# Patient Record
Sex: MALE | Race: WHITE
[De-identification: names, ages, dates, MRNs, and addresses within clinical notes are randomized per-mention and may not be internally consistent; named-entity substitution may affect disease eponyms.]

---

## 2017-07-02 NOTE — EDM.PDOC
[id number]   4d


CONFUSED AND FALLING


Time Seen by Provider: 07/02/17 19:30


Source of Information: Reports: Patient, Family


History Limitations: Reports: Altered Mental Status (Significant confusion)





- History of Present Illness


INITIAL COMMENTS - FREE TEXT/NARRATIVE: 





84-year-old male brought in by his daughter with increased confusion, 

intermittent hallucinations and increased falling. He has bruising to the left 

side of his chest, a few scattered bruises on his arms and anterior chest. He 

is also complaining of some left buttock discomfort but there is no bruising. 

He has long-standing normal mental status without confusion but it has become 

much worse over the past several weeks. No fevers or chills, no shortness of 

breath. He lives at an assisted living facility, today he was sitting in a 

wheelchair in his underwear in the hallway thinking it was [place] time and 

he was getting some children visiting him. This is very uncharacteristic of 

him. He has not been vomiting, feverish, a urine was checked last week which 

showed a few RBCs but no sign of infection. He was scheduled to be rechecked at 

the clinic this week but his daughter canceled that because the patient told 

her he "didn't need to go".


Onset: Unknown/Unsure


Severity: Moderate


Associated Symptoms: Reports: Other (Falling frequently)





- Related Data


 Allergies











Allergy/AdvReac Type Severity Reaction Status Date / Time


 


Penicillins Allergy Mild Swelling Verified 07/02/17 19:19


 


fentanyl [From Duragesic] Allergy  Rash Verified 07/02/17 19:19


 


nitroglycerin Allergy  Fainting Verified 07/02/17 19:19











Home Meds: 


 Home Meds





Metoprolol Succinate 25 mg PO QAM 09/07/13 [History]


Nortriptyline HCl [Pamelor] 150 mg PO BEDTIME 09/07/13 [History]


Omeprazole 20 mg PO DAILY 09/07/13 [History]


Pravastatin Sodium 20 mg PO DAILY 09/07/13 [History]


Warfarin [Coumadin] 2.5 mg PO .SMTTFS 09/07/13 [History]


Sennosides [Senna] 1 tab PO BID 10/24/13 [History]


Triamcinolone Acetonide [Triamcinolone Acetonide 0.1% Oint] 1 applic TOP TID 10/

24/13 [History]


traZODone 50 mg PO BEDTIME 10/24/13 [History]


Nystatin 1 applic TOP TID 11/05/14 [History]


Alum Hydrox/Mag Hydrox/Simeth [Maalox Advanced] 15 ml PO Q4H PRN 11/10/14 [

History]


Loperamide HCl [Anti-Diarrheal] 2 - 4 mg PO ASDIRECTED PRN 11/10/14 [History]


Magnesium Hydroxide [Milk of Magnesia] 30 ml PO DAILY PRN 11/10/14 [History]


Polyethylene Glycol 3350 [MiraLAX] 17 g PO DAILY 11/10/14 [History]


Warfarin [Coumadin] 3.75 mg PO .WED 11/10/14 [History]


Acetaminophen [Tylenol] 650 mg PO .Q4-6H PRN 07/14/15 [History]


Acetaminophen [Tylenol] 650 mg PO BID 07/14/15 [History]











Past Medical History





- Infectious Disease History


Infectious Disease History: Reports: Chicken Pox, Measles





Social & Family History





- Tobacco Use


Smoking Status *Q: Current Status Unknown


Years of Tobacco use: 30


Used Tobacco, but Quit: Yes


Month Tobacco Last Used: January


Second Hand Smoke Exposure: No





- Caffeine Use


Caffeine Use: Reports: Coffee





- Alcohol Use


Days Per Week of Alcohol Use: 1


Number of Drinks Per Day: 1


Total Drinks Per Week: 1





- Recreational Drug Use


Recreational Drug Use: No





ED ROS GENERAL





- Review of Systems


Review Of Systems: See Below


Constitutional: Reports: Weakness.  Denies: Fever, Chills, Malaise


HEENT: Reports: No Symptoms


Respiratory: Denies: Shortness of Breath


Cardiovascular: Reports: Chest Pain (Musculoskeletal discomfort only).  Denies: 

Palpitations


GI/Abdominal: Denies: Abdominal Pain, Nausea, Vomiting


: Reports: No Symptoms (Recent UA was negative except for a few RBCs)


Musculoskeletal: Reports: Other (Chest wall pain, some left wrist discomfort 

and left buttock discomfort from falls)


Skin: Reports: Bruising


Neurological: Reports: Confusion


Psychiatric: Reports: Hallucinations





ED EXAM, GENERAL





- Physical Exam


Exam: See Below


Exam Limited By: No Limitations (Patient is calm and cooperative, answering 

questions appropriately)


General Appearance: Alert, No Apparent Distress


Eye Exam: Bilateral Eye: Abnormal EOM


Throat/Mouth: Normal Inspection


Head: Atraumatic


Neck: Supple, Other (Some mild paraspinal discomfort which is chronic and stable

)


Respiratory/Chest: Lungs Clear


Cardiovascular: Regular Rate, Rhythm, Other (Patient has bruising over the left 

upper lateral chest wall with tenderness to palpation)


GI/Abdominal: Soft, Non-Tender


Extremities: Other (Some bruising over the left wrist but no significant bony 

tenderness or deformity)


Psychiatric: Normal Affect, Normal Mood.  No: Anxious





Course





- Vital Signs


Last Recorded V/S: 


 Last Vital Signs











Temp  97.0 F   07/02/17 19:13


 


Pulse  72   07/02/17 19:13


 


Resp  16   07/02/17 19:13


 


BP  127/68   07/02/17 19:13


 


Pulse Ox  97   07/02/17 19:13














- Orders/Labs/Meds


Orders: 


 Active Orders 24 hr











 Category Date Time Status


 


 Chest wo Cont [CT] Stat Exams  07/02/17 19:41 Taken


 


 Head wo Cont [CT] Stat Exams  07/02/17 19:41 Taken











Labs: 


 Laboratory Tests











  07/02/17 07/02/17 07/02/17 Range/Units





  19:58 19:58 19:58 


 


WBC  7.4    (4.5-11.0)  K/uL


 


RBC  3.63 L    (4.30-5.90)  M/uL


 


Hgb  11.2 L    (12.0-15.0)  g/dL


 


Hct  35.1 L    (40.0-54.0)  %


 


MCV  97    (80-98)  fL


 


MCH  31    (27-31)  pg


 


MCHC  32    (32-36)  %


 


Plt Count  196    (150-400)  K/uL


 


Neut % (Auto)  64    (36-66)  %


 


Lymph % (Auto)  23 L    (24-44)  %


 


Mono % (Auto)  8 H    (2-6)  %


 


Eos % (Auto)  6 H    (2-4)  %


 


Baso % (Auto)  0    (0-1)  %


 


PT   34.4 H   (9.5-12.0)  sec


 


INR   3.07 H   (0.80-1.20)  


 


Sodium    141  (140-148)  mmol/L


 


Potassium    3.9  (3.6-5.2)  mmol/L


 


Chloride    105  (100-108)  mmol/L


 


Carbon Dioxide    33 H  (21-32)  mmol/L


 


Anion Gap    6.9  (5.0-14.0)  mmol/L


 


BUN    22 H  (7-18)  mg/dL


 


Creatinine    1.6 H  (0.8-1.3)  mg/dL


 


Est Cr Clr Drug Dosing    39.96  mL/min


 


Estimated GFR (MDRD)    41 L  (>60)  


 


Glucose    112 H  ()  mg/dL


 


Calcium    7.9 L  (8.5-10.1)  mg/dL


 


Total Bilirubin    0.4  (0.2-1.0)  mg/dL


 


AST    18  (15-37)  U/L


 


ALT    15  (12-78)  U/L


 


Alkaline Phosphatase    77  ()  U/L


 


Total Protein    5.9 L  (6.4-8.2)  g/dL


 


Albumin    2.9 L  (3.4-5.0)  g/dL


 


Globulin    3.0  (2.3-3.5)  g/dL


 


Albumin/Globulin Ratio    1.0 L  (1.2-2.2)  














- Re-Assessments/Exams


Free Text/Narrative Re-Assessment/Exam: 





07/02/17 19:49


A PT PTT, CBC and CMP will be obtained as well as a head and chest CT scan 

without contrast.


07/02/17 21:07


Head CT was negative, chest CT showed no acute findings or fractures. Some 

linear atelectasis was seen but he has no symptoms of pneumonia. He is now 

thinking clearly, can stand on his own and use a urinal and seems stable, no 

reason for admission at this time. I would like him to get into his primary 

care provider to review his medications as soon as possible. He can return if 

worsening.














Departure





- Departure


Time of Disposition: 21:27


Disposition: DC/Tfer to [facility]


Condition: Fair


Clinical Impression: 


 Confusion, Weakness, Multiple contusions








- Discharge Information


Instructions:  Confusion, Contusion, Easy-to-Read, Weakness


Referrals: 


En Teran MD [Primary Care Provider] - 


Forms:  ED Department Discharge


Care Plan Goals: 


Continue current medications, schedule an appointment in the very near future 

to discuss medications and possible side effects with your primary provider. 

Return any time if worsening or concerns.





- My Orders


Last 24 Hours: 


My Active Orders





07/02/17 19:41


Chest wo Cont [CT] Stat 


Head wo Cont [CT] Stat 














- Assessment/Plan


Last 24 Hours: 


My Active Orders





07/02/17 19:41


Chest wo Cont [CT] Stat 


Head wo Cont [CT] Stat

## 2019-10-02 ENCOUNTER — HOSPITAL ENCOUNTER (EMERGENCY)
Dept: HOSPITAL 11 - JP.ED | Age: 84
LOS: 1 days | Discharge: HOME | End: 2019-10-03
Payer: MEDICARE

## 2019-10-02 DIAGNOSIS — Z79.899: ICD-10-CM

## 2019-10-02 DIAGNOSIS — I10: ICD-10-CM

## 2019-10-02 DIAGNOSIS — Z88.8: ICD-10-CM

## 2019-10-02 DIAGNOSIS — R07.89: Primary | ICD-10-CM

## 2019-10-02 DIAGNOSIS — Z88.5: ICD-10-CM

## 2019-10-02 DIAGNOSIS — Z90.49: ICD-10-CM

## 2019-10-02 DIAGNOSIS — Z88.0: ICD-10-CM

## 2019-10-02 NOTE — CRLCR
INDICATION:



Chest pressure



TECHNIQUE:



Frontal view of the chest. 



COMPARISON:



Chest two views 11/10/2014



FINDINGS/IMPRESSION:



There is mild left basilar atelectasis. The lungs are otherwise clear. 

There is no pleural effusion or pneumothorax.



The cardiomediastinal silhouette is normal. Dual lead pacemaker is noted.



There are minimally displaced fractures of the right 8th, 9th, and 10th 

ribs with callus formation.



Dictated by Saira Goff MD @ Oct  2 2019 11:01PM



Signed by Dr. Saira Goff @ Oct  2 2019 11:01PM

## 2019-10-02 NOTE — EDM.PDOC
ED HPI GENERAL MEDICAL PROBLEM





- General


Chief Complaint: Chest Pain


Stated Complaint: MEDICAL VIA NORTH


Time Seen by Provider: 10/02/19 22:06


Source of Information: Reports: Patient


History Limitations: Reports: No Limitations





- History of Present Illness


INITIAL COMMENTS - FREE TEXT/NARRATIVE: 





pt has had 2 days of chest pressure and abdomanal pain. He has been ghaving 

regular bms. He has not been sweaty when he has the pressure. He has a 

pacemaker and he feels like his heart is going fast at time. He did take 3 full 

size asa at home. He has had the abdomanal pain which he decribes in all 4 

quadrants. 


Onset: Other ( started 2 days ago. )


Duration: Hour(s):


Location: Reports: Chest, Abdomen


Associated Symptoms: Reports: Chest Pain, Weakness, Other ( feels like his 

heart is racing. )





- Related Data


 Allergies











Allergy/AdvReac Type Severity Reaction Status Date / Time


 


diazepam [From Valium] Allergy  Change Verified 10/02/19 21:43





   Mental  





   Status  


 


fentanyl Allergy  Rash Verified 10/02/19 21:43


 


nitroglycerin Allergy  Cannot Verified 10/02/19 21:43





   Remember  


 


Penicillins Allergy  Rash Verified 10/02/19 21:43











Home Meds: 


 Home Meds





Metoprolol Succinate 25 mg PO QAM 09/07/13 [History]


Nortriptyline HCl [Pamelor] 150 mg PO BEDTIME 09/07/13 [History]


Omeprazole 20 mg PO DAILY 09/07/13 [History]


Pravastatin Sodium 20 mg PO DAILY 09/07/13 [History]


Warfarin [Coumadin] 2.5 mg PO .MTWTF 09/07/13 [History]


Sennosides [Senna] 1 tab PO BID 10/24/13 [History]


Triamcinolone Acetonide [Triamcinolone Acetonide 0.1% Oint] 1 applic TOP TID 10/

24/13 [History]


traZODone 100 mg PO BEDTIME 10/24/13 [History]


Nystatin 1 applic TOP TID 11/05/14 [History]


Alum Hydrox/Mag Hydrox/Simeth [Maalox Advanced] 15 ml PO Q4H PRN 11/10/14 [

History]


Magnesium Hydroxide [Milk of Magnesia] 30 ml PO DAILY PRN 11/10/14 [History]


Polyethylene Glycol 3350 [MiraLAX] 17 g PO DAILY 11/10/14 [History]


Warfarin [Coumadin] 1.25 mg PO .SS 11/10/14 [History]


Acetaminophen [Tylenol] 650 mg PO BID 07/14/15 [History]


ALPRAZolam [Xanax] 0.25 mg PO DAILY PRN 10/02/19 [History]


Cetirizine [ZyrTEC] 10 mg PO DAILY 10/02/19 [History]


Erythromycin Base [Erythromycin] 1 drop EYEBOTH TID 10/02/19 [History]


Gabapentin [Neurontin] 100 mg PO TID 10/02/19 [History]


Lactulose [Chronulac] 30 ml PO DAILY 10/02/19 [History]


Sertraline HCl 50 mg PO DAILY 10/02/19 [History]


cycloSPORINE [Restasis] 1 each EYEBOTH BID 10/02/19 [History]











Past Medical History


HEENT History: Reports: Allergic Rhinitis


Cardiovascular History: Reports: Hypertension, Pacemaker


Gastrointestinal History: Reports: Chronic Constipation


Musculoskeletal History: Reports: Back Pain, Chronic, Other (See Below)


Other Musculoskeletal History: lumbago





- Infectious Disease History


Infectious Disease History: Reports: Chicken Pox, Measles





- Past Surgical History


GI Surgical History: Reports: Appendectomy


Musculoskeletal Surgical History: Reports: Arthroscopic Knee





Social & Family History





- Tobacco Use


Smoking Status *Q: Unknown Ever Smoked





- Caffeine Use


Caffeine Use: Reports: Tea





ED ROS GENERAL





- Review of Systems


Review Of Systems: See Below


Constitutional: Reports: No Symptoms


HEENT: Reports: No Symptoms


Respiratory: Reports: No Symptoms


Cardiovascular: Reports: No Symptoms


Endocrine: Reports: No Symptoms


GI/Abdominal: Reports: Abdominal Pain, Other (pt state he has abdomanal pain. 

He had a bm today. )


: Reports: No Symptoms


Musculoskeletal: Reports: No Symptoms


Skin: Reports: No Symptoms





ED EXAM, GENERAL





- Physical Exam


Exam: See Below


Free Text/Narrative:: 





pt arrived with pain in his chest and in his abdoman. He had a normal bm today. 


Exam Limited By: No Limitations


General Appearance: Alert, Anxious, Mild Distress


Ears: Normal TMs


Nose: Normal Inspection


Throat/Mouth: Normal Inspection


Head: Atraumatic


Neck: Normal Inspection


Respiratory/Chest: No Respiratory Distress


Cardiovascular: Regular Rate, Rhythm


GI/Abdominal: Soft, Other (pt talks about mild tenderness but he does not have 

guarding. )


 (Male) Exam: Deferred, Other (bladder scan was done and he had 285 in his 

bladder. )


Rectal (Males) Exam: Deferred


Back Exam: Normal Inspection


Extremities: Normal Inspection


Neurological: Alert, Oriented, Normal Cognition


Psychiatric: Normal Affect





Course





- Vital Signs


Last Recorded V/S: 


 Last Vital Signs











Temp  36.0 C   10/02/19 21:43


 


Pulse  60   10/03/19 00:26


 


Resp  13   10/03/19 00:26


 


BP  147/76 H  10/03/19 00:26


 


Pulse Ox  92 L  10/03/19 00:26














- Orders/Labs/Meds


Orders: 


 Active Orders 24 hr











 Category Date Time Status


 


 Bladder Scan [RC] ASDIRECTED Care  10/02/19 22:47 Active


 


 EKG Documentation Completion [RC] ASDIRECTED Care  10/02/19 21:43 Active


 


 UA W/MICROSCOPIC [URIN] Urgent Lab  10/02/19 21:43 Ordered


 


 EKG 12 Lead [EK] Routine Ther  10/02/19 21:35 Ordered


 


 EKG 12 Lead [EK] Routine Ther  10/02/19 21:43 Ordered











Labs: 


 Laboratory Tests











  10/02/19 10/02/19 10/02/19 Range/Units





  21:43 21:43 21:43 


 


WBC   6.7   (4.5-11.0)  K/uL


 


RBC   3.75 L   (4.30-5.90)  M/uL


 


Hgb   11.5 L   (12.0-15.0)  g/dL


 


Hct   35.9 L   (40.0-54.0)  %


 


MCV   96   (80-98)  fL


 


MCH   31   (27-31)  pg


 


MCHC   32   (32-36)  %


 


Plt Count   192   (150-400)  K/uL


 


Neut % (Auto)   65   (36-66)  %


 


Lymph % (Auto)   25   (24-44)  %


 


Mono % (Auto)   8 H   (2-6)  %


 


Eos % (Auto)   2   (2-4)  %


 


Baso % (Auto)   0   (0-1)  %


 


PT     (9.5-12.0)  sec


 


INR     (0.80-1.20)  


 


Sodium    140  (140-148)  mmol/L


 


Potassium    4.2  (3.6-5.2)  mmol/L


 


Chloride    103  (100-108)  mmol/L


 


Carbon Dioxide    30  (21-32)  mmol/L


 


Anion Gap    6.9  (5.0-14.0)  mmol/L


 


BUN    40 H D  (7-18)  mg/dL


 


Creatinine    1.5 H  (0.8-1.3)  mg/dL


 


Est Cr Clr Drug Dosing    39.95  mL/min


 


Estimated GFR (MDRD)    44 L  (>60)  


 


Glucose    130 H  ()  mg/dL


 


Calcium    9.2  D  (8.5-10.1)  mg/dL


 


Total Bilirubin    0.3  (0.2-1.0)  mg/dL


 


AST    14 L  (15-37)  U/L


 


ALT    13  (12-78)  U/L


 


Alkaline Phosphatase    81  ()  U/L


 


Troponin I    < 0.017  (0.000-0.056)  ng/mL


 


C-Reactive Protein  0.08    (0.0-0.3)  mg/dL


 


Total Protein    6.7  (6.4-8.2)  g/dL


 


Albumin    3.4  (3.4-5.0)  g/dL


 


Globulin    3.3  (2.3-3.5)  g/dL


 


Albumin/Globulin Ratio    1.0 L  (1.2-2.2)  














  10/02/19 10/02/19 Range/Units





  22:50 23:50 


 


WBC    (4.5-11.0)  K/uL


 


RBC    (4.30-5.90)  M/uL


 


Hgb    (12.0-15.0)  g/dL


 


Hct    (40.0-54.0)  %


 


MCV    (80-98)  fL


 


MCH    (27-31)  pg


 


MCHC    (32-36)  %


 


Plt Count    (150-400)  K/uL


 


Neut % (Auto)    (36-66)  %


 


Lymph % (Auto)    (24-44)  %


 


Mono % (Auto)    (2-6)  %


 


Eos % (Auto)    (2-4)  %


 


Baso % (Auto)    (0-1)  %


 


PT  14.7 H   (9.5-12.0)  sec


 


INR  1.39 H   (0.80-1.20)  


 


Sodium    (140-148)  mmol/L


 


Potassium    (3.6-5.2)  mmol/L


 


Chloride    (100-108)  mmol/L


 


Carbon Dioxide    (21-32)  mmol/L


 


Anion Gap    (5.0-14.0)  mmol/L


 


BUN    (7-18)  mg/dL


 


Creatinine    (0.8-1.3)  mg/dL


 


Est Cr Clr Drug Dosing    mL/min


 


Estimated GFR (MDRD)    (>60)  


 


Glucose    ()  mg/dL


 


Calcium    (8.5-10.1)  mg/dL


 


Total Bilirubin    (0.2-1.0)  mg/dL


 


AST    (15-37)  U/L


 


ALT    (12-78)  U/L


 


Alkaline Phosphatase    ()  U/L


 


Troponin I   < 0.017  (0.000-0.056)  ng/mL


 


C-Reactive Protein    (0.0-0.3)  mg/dL


 


Total Protein    (6.4-8.2)  g/dL


 


Albumin    (3.4-5.0)  g/dL


 


Globulin    (2.3-3.5)  g/dL


 


Albumin/Globulin Ratio    (1.2-2.2)  














- Re-Assessments/Exams


Free Text/Narrative Re-Assessment/Exam: 





10/03/19 00:10


pt had a normal trop. He has a pacemaker in place and that is funtioning well . 

. His creatnine is elevated but I believe that is chronic. 


10/03/19 00:11


 We were not able to get a urine on the pt. 


10/03/19 00:12


 A second trop was obtained on the pt/ and this was normal. Dr Piedra was sumanth 

in the ER and advised me that his daughter had concerns about some attention 

getting motives. Will plan to send him back to his apartment and Dr Piedra is 

willing to see him on Friday to recheck.  


10/03/19 00:35








Departure





- Departure


Time of Disposition: 00:37


Disposition: Home, Self-Care 01


Condition: Fair


Clinical Impression: 


 Chest pressure, History of constipation





Referrals: 


PCP,None [Primary Care Provider] - 


Forms:  ED Department Discharge


Care Plan Goals: 


 appt with Dr Piedra Friday at the clinic. encourage fluids, a UA should be 

checked on Friday.  Continue his meds the same. 





- My Orders


Last 24 Hours: 


My Active Orders





10/02/19 21:35


EKG 12 Lead [EK] Routine 





10/02/19 21:43


EKG Documentation Completion [RC] ASDIRECTED 


UA W/MICROSCOPIC [URIN] Urgent 


EKG 12 Lead [EK] Routine 





10/02/19 22:47


Bladder Scan [RC] ASDIRECTED 














- Assessment/Plan


Last 24 Hours: 


My Active Orders





10/02/19 21:35


EKG 12 Lead [EK] Routine 





10/02/19 21:43


EKG Documentation Completion [RC] ASDIRECTED 


UA W/MICROSCOPIC [URIN] Urgent 


EKG 12 Lead [EK] Routine 





10/02/19 22:47


Bladder Scan [RC] ASDIRECTED

## 2019-10-03 VITALS — HEART RATE: 60 BPM | DIASTOLIC BLOOD PRESSURE: 76 MMHG | SYSTOLIC BLOOD PRESSURE: 147 MMHG

## 2019-12-05 ENCOUNTER — HOSPITAL ENCOUNTER (EMERGENCY)
Dept: HOSPITAL 11 - JP.ED | Age: 84
Discharge: HOME | End: 2019-12-05
Payer: MEDICARE

## 2019-12-05 VITALS — DIASTOLIC BLOOD PRESSURE: 85 MMHG | HEART RATE: 103 BPM | SYSTOLIC BLOOD PRESSURE: 167 MMHG

## 2019-12-05 DIAGNOSIS — Z79.01: ICD-10-CM

## 2019-12-05 DIAGNOSIS — Z90.49: ICD-10-CM

## 2019-12-05 DIAGNOSIS — I12.9: ICD-10-CM

## 2019-12-05 DIAGNOSIS — Z79.899: ICD-10-CM

## 2019-12-05 DIAGNOSIS — N18.9: ICD-10-CM

## 2019-12-05 DIAGNOSIS — Z88.8: ICD-10-CM

## 2019-12-05 DIAGNOSIS — K91.840: Primary | ICD-10-CM

## 2019-12-05 DIAGNOSIS — Z88.0: ICD-10-CM

## 2019-12-05 NOTE — EDM.PDOC
ED HPI GENERAL MEDICAL PROBLEM





- General


Chief Complaint: ENT Problem


Stated Complaint: TOOTH PULLED STILL BLEEDING


Time Seen by Provider: 12/05/19 21:00


Source of Information: Reports: Patient


History Limitations: Reports: No Limitations





- History of Present Illness


INITIAL COMMENTS - FREE TEXT/NARRATIVE: 


87-year-old with history of atrial fibrillation on Coumadin for this presents 

with concerns of bleeding from a tooth extraction site. Reports that he had a 

tooth removed at approximately 7:30 AM this morning. There was bleeding 

immediately post procedurally, however it subsided a bit his dentist allowed 

him to go home while the tooth was still bleeding. The bleeding has persisted 

throughout the day today. He has not had an INR checked recently. No history of 

prior bleeding problems from his Coumadin use. He is not short of breath, 

lightheaded or endorsing any chest pain.








- Related Data


 Allergies











Allergy/AdvReac Type Severity Reaction Status Date / Time


 


diazepam [From Valium] Allergy  Change Verified 12/05/19 21:09





   Mental  





   Status  


 


fentanyl Allergy  Rash Verified 12/05/19 21:09


 


nitroglycerin Allergy  Cannot Verified 12/05/19 21:09





   Remember  


 


Penicillins Allergy  Rash Verified 12/05/19 21:09











Home Meds: 


 Home Meds





Metoprolol Succinate 25 mg PO QAM 09/07/13 [History]


Nortriptyline HCl [Pamelor] 150 mg PO BEDTIME 09/07/13 [History]


Omeprazole 20 mg PO DAILY 09/07/13 [History]


Pravastatin Sodium 20 mg PO DAILY 09/07/13 [History]


Warfarin [Coumadin] 2.5 mg PO .MTWTF 09/07/13 [History]


Sennosides [Senna] 1 tab PO BID 10/24/13 [History]


Triamcinolone Acetonide [Triamcinolone Acetonide 0.1% Oint] 1 applic TOP TID 10/

24/13 [History]


traZODone 100 mg PO BEDTIME 10/24/13 [History]


Nystatin 1 applic TOP TID 11/05/14 [History]


Alum Hydrox/Mag Hydrox/Simeth [Maalox Advanced] 15 ml PO Q4H PRN 11/10/14 [

History]


Magnesium Hydroxide [Milk of Magnesia] 30 ml PO DAILY PRN 11/10/14 [History]


Polyethylene Glycol 3350 [MiraLAX] 17 g PO DAILY 11/10/14 [History]


Warfarin [Coumadin] 1.25 mg PO .SS 11/10/14 [History]


Acetaminophen [Tylenol] 650 mg PO BID 07/14/15 [History]


ALPRAZolam [Xanax] 0.25 mg PO DAILY PRN 10/02/19 [History]


Cetirizine [ZyrTEC] 10 mg PO DAILY 10/02/19 [History]


Erythromycin Base [Erythromycin] 1 drop EYEBOTH TID 10/02/19 [History]


Gabapentin [Neurontin] 100 mg PO TID 10/02/19 [History]


Lactulose [Chronulac] 30 ml PO DAILY 10/02/19 [History]


Sertraline HCl 50 mg PO DAILY 10/02/19 [History]


cycloSPORINE [Restasis] 1 each EYEBOTH BID 10/02/19 [History]











Past Medical History


HEENT History: Reports: Allergic Rhinitis


Cardiovascular History: Reports: Hypertension, Pacemaker


Gastrointestinal History: Reports: Chronic Constipation


Genitourinary History: Reports: BPH, Chronic Renal Insuffiency


Musculoskeletal History: Reports: Back Pain, Chronic, Other (See Below)


Other Musculoskeletal History: lumbago


Neurological History: Reports: Neuropathy, Peripheral


Psychiatric History: Reports: Other (See Below)


Other Psychiatric History: mild cognitive impairment


Hematologic History: Reports: Anticoagulation Therapy





- Infectious Disease History


Infectious Disease History: Reports: Chicken Pox, Measles





- Past Surgical History


GI Surgical History: Reports: Appendectomy


Musculoskeletal Surgical History: Reports: Arthroscopic Knee





Social & Family History





- Tobacco Use


Smoking Status *Q: Never Smoker





- Caffeine Use


Caffeine Use: Reports: Tea





- Recreational Drug Use


Recreational Drug Use: No





ED ROS ENT





- Review of Systems


Review Of Systems: See Below


Constitutional: Reports: No Symptoms


HEENT: Reports: Other (bleeding from gums)


Respiratory: Reports: No Symptoms


Cardiovascular: Reports: No Symptoms


Endocrine: Reports: No Symptoms


GI/Abdominal: Reports: No Symptoms


: Reports: No Symptoms


Musculoskeletal: Reports: No Symptoms


Skin: Reports: No Symptoms


Neurological: Reports: No Symptoms


Psychiatric: Reports: No Symptoms


Hematologic/Lymphatic: Reports: No Symptoms


Immunologic: Reports: No Symptoms





ED EXAM, ENT





- Physical Exam


Exam: See Below


Exam Limited By: No Limitations


General Appearance: Alert, No Apparent Distress


Ears: Normal External Exam


Nose: Normal Inspection


Mouth/Throat: Dental Abcess, Other (Oozing blood at the site of right, upper, 

frontal tooth extraction site. There is some clot.)


Head: Atraumatic, Normocephalic


Respiratory/Chest: No Respiratory Distress


Cardiovascular: Regular Rate, Rhythm


GI/Abdominal: Soft, No Distention


Back: Normal Inspection


Extremities: Normal Inspection


Neurological: Alert, Oriented


Psychiatric: Normal Affect, Normal Mood


Skin: Warm, Dry





Course





- Vital Signs


Last Recorded V/S: 


 Last Vital Signs











Temp  37.7 C   12/05/19 21:08


 


Pulse  103 H  12/05/19 21:08


 


Resp  16   12/05/19 21:08


 


BP  167/85 H  12/05/19 21:08


 


Pulse Ox  90 L  12/05/19 21:08














- Orders/Labs/Meds


Labs: 


 Laboratory Tests











  12/05/19 Range/Units





  21:10 


 


PT  25.1 H  (9.5-12.0)  sec


 


INR  2.44 H  (0.80-1.20)  











Meds: 


Medications














Discontinued Medications














Generic Name Dose Route Start Last Admin





  Trade Name Freq  PRN Reason Stop Dose Admin


 


Tranexamic Acid  500 mg  12/05/19 21:08  12/05/19 21:18





  Cyklokapron  TOP  12/05/19 21:09  500 mg





  ONETIME ONE   Administration





     





     





     





     














- Re-Assessments/Exams


Free Text/Narrative Re-Assessment/Exam: 


87-year-old with history of Coumadin use presents with concerns of bleeding 

from tooth extraction site. This has been ongoing now for over 12 hours.


He appears well, no airway concerns.


We are applying pressure with TXA impregnated gauze, checking INR








12/05/19 21:29





Free Text/Narrative Re-Assessment/Exam: 


Bleeding stopped after 25 minutes of direct pressure by RN.


INR within therapeutic range at 2.4


Safe for discharge. Will follow-up if recurrent oozing in AM with dentist.


12/05/19 22:19








Departure





- Departure


Time of Disposition: 22:20


Disposition: Home, Self-Care 01


Clinical Impression: 


 Surgical wound hemorrhage after dental procedure








- Discharge Information


Referrals: 


PCP,None [Primary Care Provider] - 


Forms:  ED Department Discharge


Additional Instructions: 


Please avoid disturbing the blood clot where your tooth was pulled.


If you noticed persistent oozing in the morning, call your dentist.


Return to the ER for recurrent, persistent bleeding (it is okay if there is 

mild oozing overnight)

## 2020-06-28 ENCOUNTER — HOSPITAL ENCOUNTER (EMERGENCY)
Dept: HOSPITAL 11 - JP.ED | Age: 85
Discharge: NURSING, REHAB OR LONG-TERM CARE | End: 2020-06-28
Payer: MEDICARE

## 2020-06-28 VITALS — SYSTOLIC BLOOD PRESSURE: 152 MMHG | HEART RATE: 60 BPM | DIASTOLIC BLOOD PRESSURE: 83 MMHG

## 2020-06-28 DIAGNOSIS — Z88.0: ICD-10-CM

## 2020-06-28 DIAGNOSIS — Z88.8: ICD-10-CM

## 2020-06-28 DIAGNOSIS — G62.9: ICD-10-CM

## 2020-06-28 DIAGNOSIS — S70.01XA: Primary | ICD-10-CM

## 2020-06-28 DIAGNOSIS — M25.562: ICD-10-CM

## 2020-06-28 DIAGNOSIS — Z79.899: ICD-10-CM

## 2020-06-28 DIAGNOSIS — Z88.6: ICD-10-CM
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
## 2020-06-28 DIAGNOSIS — I12.9: ICD-10-CM

## 2020-06-28 DIAGNOSIS — N18.9: ICD-10-CM

## 2020-06-28 DIAGNOSIS — Z88.4: ICD-10-CM

## 2020-06-28 DIAGNOSIS — G89.29: ICD-10-CM

## 2020-06-28 NOTE — EDM.PDOC
ED HPI GENERAL MEDICAL PROBLEM





- General


Chief Complaint: Lower Extremity Injury/Pain


Stated Complaint: MEDICAL VIA NORTH


Time Seen by Provider: 06/28/20 05:05


Source of Information: Reports: Patient, EMS


History Limitations: Reports: No Limitations





- History of Present Illness


INITIAL COMMENTS - FREE TEXT/NARRATIVE: 





87-year-old male arrives by ambulance with left knee pain and right hip pain.  

He has had frequent falls, he fell 4 days ago which exacerbated the left knee 

pain and they set him up for physical therapy and Occupational Therapy.  He does

not feel his pain is controlled, and he fell again tonight so he insisted on 

calling the ambulance.  There is no asymmetry of the lower extremities.  No 

outward evidence of bruising or swelling of the left knee.


Onset: Other (Several falls contributing to current symptoms)


Location: Reports: Lower Extremity, Left, Lower Extremity, Right


Associated Symptoms: Reports: Confusion (Chronic).  Denies: Nausea/Vomiting


  ** left knee


Pain Score (Numeric/FACES): 6





- Related Data


                                    Allergies











Allergy/AdvReac Type Severity Reaction Status Date / Time


 


diazepam [From Valium] Allergy  Change Verified 06/28/20 05:16





   Mental  





   Status  


 


fentanyl Allergy  Rash Verified 06/28/20 05:16


 


nitroglycerin Allergy  Cannot Verified 06/28/20 05:16





   Remember  


 


Penicillins Allergy  Rash Verified 06/28/20 05:16











Home Meds: 


                                    Home Meds





Metoprolol Succinate 25 mg PO QAM 09/07/13 [History]


Nortriptyline HCl [Pamelor] 150 mg PO BEDTIME 09/07/13 [History]


Omeprazole 20 mg PO DAILY 09/07/13 [History]


Pravastatin Sodium 20 mg PO DAILY 09/07/13 [History]


Warfarin [Coumadin] 2.5 mg PO .MTWTF 09/07/13 [History]


Sennosides [Senna] 1 tab PO BID 10/24/13 [History]


Triamcinolone Acetonide [Triamcinolone Acetonide 0.1% Oint] 1 applic TOP TID 

10/24/13 [History]


traZODone 100 mg PO BEDTIME 10/24/13 [History]


Nystatin 1 applic TOP TID 11/05/14 [History]


Alum Hydrox/Mag Hydrox/Simeth [Maalox Advanced] 15 ml PO Q4H PRN 11/10/14 

[History]


Magnesium Hydroxide [Milk of Magnesia] 30 ml PO DAILY PRN 11/10/14 [History]


Warfarin [Coumadin] 1.25 mg PO .SS 11/10/14 [History]


polyethylene glycoL 3350 [MiraLAX] 17 g PO DAILY 11/10/14 [History]


Acetaminophen [Tylenol] 650 mg PO BID 07/14/15 [History]


ALPRAZolam [Xanax] 0.25 mg PO DAILY PRN 10/02/19 [History]


Cetirizine [ZyrTEC] 10 mg PO DAILY 10/02/19 [History]


Erythromycin Base [Erythromycin] 1 drop EYEBOTH TID 10/02/19 [History]


Gabapentin [Neurontin] 100 mg PO TID 10/02/19 [History]


Lactulose [Chronulac] 30 ml PO DAILY 10/02/19 [History]


Sertraline HCl 50 mg PO DAILY 10/02/19 [History]


cycloSPORINE [Restasis] 1 each EYEBOTH BID 10/02/19 [History]











Past Medical History


HEENT History: Reports: Allergic Rhinitis


Cardiovascular History: Reports: Hypertension, Pacemaker


Gastrointestinal History: Reports: Chronic Constipation


Genitourinary History: Reports: BPH, Chronic Renal Insuffiency


Musculoskeletal History: Reports: Back Pain, Chronic, Other (See Below)


Other Musculoskeletal History: lumbago


Neurological History: Reports: Neuropathy, Peripheral


Psychiatric History: Reports: Other (See Below)


Other Psychiatric History: mild cognitive impairment


Hematologic History: Reports: Anticoagulation Therapy





- Infectious Disease History


Infectious Disease History: Reports: Chicken Pox, Measles





- Past Surgical History


GI Surgical History: Reports: Appendectomy


Musculoskeletal Surgical History: Reports: Arthroscopic Knee





Social & Family History





- Caffeine Use


Caffeine Use: Reports: Tea





Review of Systems





- Review of Systems


Review Of Systems: See Below


Constitutional: Denies: Fever


Respiratory: Denies: Shortness of Breath


Cardiovascular: Denies: Chest Pain


Skin: Denies: Bruising


Neurological: Reports: Confusion





ED EXAM, GENERAL





- Physical Exam


Exam: See Below


Exam Limited By: No Limitations


General Appearance: Alert, No Apparent Distress


Respiratory/Chest: No Respiratory Distress


Cardiovascular: Regular Rate, Rhythm


GI/Abdominal: Soft, Non-Tender


Extremities: Other (Minimal palpation tenderness around the right hip and no 

significant pain with passive range of motion of the hip.  He does have fairly 

significant palpation tenderness to the medial and lateral left knee and 

increased pain with valgus stress to the knee.  There is no effusion or 

asymmetry.)


Neurological: Alert, Confused


Psychiatric: Normal Affect, Normal Mood


Skin Exam: Warm, Dry





Course





- Vital Signs


Last Recorded V/S: 


                                Last Vital Signs











Temp  98.4 F   06/28/20 05:37


 


Pulse  60   06/28/20 05:37


 


Resp  18   06/28/20 05:37


 


BP  152/83 H  06/28/20 05:37


 


Pulse Ox  93 L  06/28/20 05:37














- Orders/Labs/Meds


Orders: 


                               Active Orders 24 hr











 Category Date Time Status


 


 Knee 3V Lt [CR] Stat Exams  06/28/20 05:09 Taken


 


 Pelvis 1V or 2V [CR] Stat Exams  06/28/20 05:09 Taken














- Re-Assessments/Exams


Free Text/Narrative Re-Assessment/Exam: 





06/28/20 05:37


A 1 view pelvis x-ray and left knee x-rays were obtained.


06/28/20 06:13


X-rays show arthritis but no fracture.  Patient was encouraged to follow-up with

 his primary provider to discuss orthopedic consultation for possible injections

 to relieve some of his discomfort and to follow through with physical therapy 

as ordered.





Departure





- Departure


Time of Disposition: 07:10


Disposition: DC/Tfer to [facility]


Clinical Impression: 


 Chronic pain of left knee





Contusion of right hip


Qualifiers:


 Encounter type: initial encounter Qualified Code(s): S70.01XA - Contusion of 

right hip, initial encounter








- Discharge Information


Instructions:  Contusion, Easy-to-Read


Referrals: 


PCP,None [Primary Care Provider] - 


Forms:  ED Department Discharge


Care Plan Goals: 


Continue with plans for physical therapy as ordered, and consider an orthopedic 

consultation for intra-articular injections into the knee for additional pain 

relief.





- My Orders


Last 24 Hours: 


My Active Orders





06/28/20 05:09


Knee 3V Lt [CR] Stat 


Pelvis 1V or 2V [CR] Stat 














- Assessment/Plan


Last 24 Hours: 


My Active Orders





06/28/20 05:09


Knee 3V Lt [CR] Stat 


Pelvis 1V or 2V [CR] Stat

## 2020-06-29 NOTE — CR
Pelvis 1V or 2V, 

 

CLINICAL HISTORY: Fall, pain

 

FINDINGS: No fracture or dislocation is identified. Patient is slightly rotated.

There is some degenerative change in lumbar spine and SI joints. There is

moderate degenerative change of both hips

 

IMPRESSION: Osteoarthritic change

 

No fracture

 

 

 

 3V Lt

 

CLINICAL HISTORY: Pain, fall

 

FINDINGS: No acute fracture or dislocation is noted. There are no osseous

lesions. There is moderate lateral joint space narrowing.

 

Impression: Osteoarthritis

 

No fracture

## 2021-05-28 ENCOUNTER — HOSPITAL ENCOUNTER (EMERGENCY)
Dept: HOSPITAL 11 - JP.ED | Age: 86
Discharge: NURSING, REHAB OR LONG-TERM CARE | End: 2021-05-28
Payer: MEDICARE

## 2021-05-28 VITALS — DIASTOLIC BLOOD PRESSURE: 78 MMHG | SYSTOLIC BLOOD PRESSURE: 140 MMHG | HEART RATE: 65 BPM

## 2021-05-28 DIAGNOSIS — Z88.4: ICD-10-CM

## 2021-05-28 DIAGNOSIS — Z79.01: ICD-10-CM

## 2021-05-28 DIAGNOSIS — N18.9: ICD-10-CM

## 2021-05-28 DIAGNOSIS — N40.0: ICD-10-CM

## 2021-05-28 DIAGNOSIS — F02.80: ICD-10-CM

## 2021-05-28 DIAGNOSIS — Z79.899: ICD-10-CM

## 2021-05-28 DIAGNOSIS — I48.91: ICD-10-CM

## 2021-05-28 DIAGNOSIS — G30.9: ICD-10-CM

## 2021-05-28 DIAGNOSIS — S01.81XA: ICD-10-CM
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
## 2021-05-28 DIAGNOSIS — Z88.8: ICD-10-CM

## 2021-05-28 DIAGNOSIS — Z88.0: ICD-10-CM

## 2021-05-28 DIAGNOSIS — Z23: ICD-10-CM

## 2021-05-28 DIAGNOSIS — Z88.1: ICD-10-CM

## 2021-05-28 DIAGNOSIS — I12.9: ICD-10-CM

## 2021-05-28 DIAGNOSIS — G62.9: ICD-10-CM

## 2021-05-28 DIAGNOSIS — S12.100A: Primary | ICD-10-CM

## 2021-05-28 DIAGNOSIS — K21.9: ICD-10-CM

## 2021-05-28 PROCEDURE — 81001 URINALYSIS AUTO W/SCOPE: CPT

## 2021-05-28 PROCEDURE — 99284 EMERGENCY DEPT VISIT MOD MDM: CPT

## 2021-05-28 PROCEDURE — 12052 INTMD RPR FACE/MM 2.6-5.0 CM: CPT

## 2021-05-28 PROCEDURE — 72125 CT NECK SPINE W/O DYE: CPT

## 2021-05-28 PROCEDURE — 96372 THER/PROPH/DIAG INJ SC/IM: CPT

## 2021-05-28 PROCEDURE — 90471 IMMUNIZATION ADMIN: CPT

## 2021-05-28 PROCEDURE — 90715 TDAP VACCINE 7 YRS/> IM: CPT

## 2021-05-28 PROCEDURE — 70450 CT HEAD/BRAIN W/O DYE: CPT

## 2021-05-28 NOTE — EDM.PDOC
<Dennys Barton - Last Filed: 05/28/21 06:22>





ED HPI GENERAL MEDICAL PROBLEM





- General


Chief Complaint: Head Injury


Stated Complaint: FALL,HIT HEAD


Time Seen by Provider: 05/28/21 06:03


Source of Information: Reports: Patient


History Limitations: Reports: No Limitations





- History of Present Illness


INITIAL COMMENTS - FREE TEXT/NARRATIVE: 


Pedro is an 88-year-old male presenting to the ED via [facility] from the 

[facility] for evaluation of a head injury.  Details are limited but it appears 

the patient has a 3 cm laceration above his left eyebrow that occurred after he 

got out of bed this morning and somehow landed on the floor.  The patient has 

Alzheimer's dementia and therefore cannot really elaborate on what is happened.





While repairing the laceration on his forehead, he states that he thinks he was 

sitting on the toilet trying to go to the bathroom and started to fall and could

not stop himself which would make more sense since it looks like he hit hard 

floor and not carpeted.  He denies any loss of consciousness.





- Related Data


                                    Allergies











Allergy/AdvReac Type Severity Reaction Status Date / Time


 


diazepam [From Valium] Allergy  Change Verified 05/28/21 05:26





   Mental  





   Status  


 


fentanyl Allergy  Rash Verified 05/28/21 05:26


 


nitroglycerin Allergy  Cannot Verified 05/28/21 05:26





   Remember  


 


Penicillins Allergy  Rash Verified 05/28/21 05:26











Home Meds: 


                                    Home Meds





Pravastatin Sodium 20 mg PO DAILY 09/07/13 [History]


Warfarin [Coumadin] 2 mg PO .SUTTHSA 09/07/13 [History]


Sennosides [Senna] 1 tab PO DAILY 10/24/13 [History]


Triamcinolone Acetonide [Triamcinolone Acetonide 0.1% Oint] 1 applic TOP BID 

10/24/13 [History]


traZODone 50 mg PO BEDTIME 10/24/13 [History]


Warfarin [Coumadin] 1.5 mg PO .MWF 11/10/14 [History]


polyethylene glycoL 3350 [MiraLAX] 17 g PO DAILY 11/10/14 [History]


Acetaminophen [Tylenol] 650 mg PO BID 07/14/15 [History]


Cetirizine [ZyrTEC] 10 mg PO DAILY 10/02/19 [History]


Gabapentin [Neurontin] 200 mg PO TID 10/02/19 [History]


Lactulose [Chronulac] 30 ml PO DAILY 10/02/19 [History]


Sertraline HCl 200 mg PO DAILY 10/02/19 [History]


cycloSPORINE [Restasis] 1 each EYEBOTH BID 10/02/19 [History]


Famotidine [Pepcid] 20 mg PO BID 05/28/21 [History]


Lactobacillus Rhamnosus GG [Culturelle] 1 cap PO TID 05/28/21 [History]


Sennosides/Docusate Sodium [Senna-S 8.6-50 mg Tablet] 2 tab PO DAILY PRN 

05/28/21 [History]


Tamsulosin [Flomax] 1 cap PO DAILY 05/28/21 [History]











Past Medical History


HEENT History: Reports: Allergic Rhinitis


Cardiovascular History: Reports: Afib, Hypertension, Pacemaker


Gastrointestinal History: Reports: Chronic Constipation, GERD


Genitourinary History: Reports: BPH, Chronic Renal Insuffiency


Musculoskeletal History: Reports: Back Pain, Chronic, Neck Pain, Chronic, Other 

(See Below)


Other Musculoskeletal History: lumbago


Neurological History: Reports: Alzheimers Disease, Neuropathy, Peripheral


Psychiatric History: Reports: Alzheimers Disease, Anxiety, Depression, Other 

(See Below)


Other Psychiatric History: mild cognitive impairment


Hematologic History: Reports: Anticoagulation Therapy





- Infectious Disease History


Infectious Disease History: Reports: Chicken Pox, Measles





- Past Surgical History


Cardiovascular Surgical History: Reports: Pacer


GI Surgical History: Reports: Appendectomy, Hernia Repair/Other


Musculoskeletal Surgical History: Reports: Arthroscopic Knee





Social & Family History





- Tobacco Use


Tobacco Use Status *Q: Unknown Ever Used Tobacco





- Caffeine Use


Caffeine Use: Reports: Tea





ED ROS GENERAL





- Review of Systems


Review Of Systems: Unable To Obtain


Reason Not Obtained: Patient has Alzheimer's dementia and is not sure of 

happened





ED EXAM, HEAD INJURY





- Physical Exam


Exam: See Below


Exam Limited By: No Limitations


General Appearance: Alert, Anxious, Mild Distress


Head: Facial Lacerations (3 cm laceration above the left eyebrow that goes 

through cutaneous and muscle tissue.), Facial Tenderness


Eyes: Bilateral Eye: EOMI, PERRL


Nose: Normal Inspection


Throat/Mouth: Normal Inspection, Normal Oropharynx


Neck: Painful Range of Motion, Paraspinous Muscle Tender, Stiff Neck, 

Tenderness, Tender Lateral.  No: Spinous Processes Tender, Tender Midline


Neurologic: CNs II-XII nml As Tested, No Motor/Sensory Deficits, Alert, Normal 

Mood/Affect





ED LACERATION/WOUND & ROSALBA PROC





- Laceration/Wound Repair


  ** Left Forehead


Lac/wound length in cm: 3


Appearance: Muscle


Distal NVT: Neuro & Vascular Intact


Anesthetic Type: Local


Local Anesthesia - Lidocaine (Xylocaine): 1% with EPI


Local Anesthetic Volume: 3cc


Skin Prep: Other (Water)


Exploration/Debridement/Repair: Wound Explored, In a Bloodless Field, Explored 

to Base


Closed with: Sutures


Suture Size: 4-0


# of Sutures: 5


Suture Type: Nylon, Interrupted


Suture Size: 4-0


# of Sutures: 2


Repaired with: Vicryl


Tetanus Status Addressed: Yes


Complications: No





Course





- Re-Assessments/Exams


Free Text/Narrative Re-Assessment/Exam: 





05/28/21 06:26 Evens is an 88-year-old male with a laceration and contusion of 

his left forehead just above the left eyebrow that is 3 cm in length.  This was 

repaired using 4-0 Vicryl with 2 inverted simple interrupted sutures to bring 

together the muscle and then with the skin closed using 4-0 Ethilon requiring 5 

simple interrupted sutures closing the skin.  A light coating of bacitracin was 

applied over this.  Due to the patient complaining of neck stiffness and 

tenderness we proceeded with a CT of the cervical spine and a CT of the head.  

The patient is anticoagulated with Coumadin.





05/28/21 07:00 Care of the patient in transferred from Dr. Barton to 

Dr. Arroyo at 0700 while awaiting the CT head wo and Ct Cervical spine wo 

results.  The patient has been able to void which is likely what caused him to 

fall off the toilet to begin once.  We will do a bladder scan and if necessary 

an in and out catheter for urinalysis.











Departure





- Departure


Disposition: [facility]


Clinical Impression: 


Fall


Qualifiers:


 Encounter type: initial encounter Qualified Code(s): W19.XXXA - Unspecified 

fall, initial encounter





Forehead contusion


Qualifiers:


 Encounter type: initial encounter Qualified Code(s): S00.83XA - Contusion of 

other part of head, initial encounter





Forehead laceration


Qualifiers:


 Encounter type: initial encounter Qualified Code(s): S01.81XA - Laceration 

without foreign body of other part of head, initial encounter





Odontoid fracture


Qualifiers:


 Encounter type: initial encounter Fracture type: closed Qualified Code(s): 

S12.100A - Unspecified displaced fracture of second cervical vertebra, initial 

encounter for closed fracture








- Discharge Information


Instructions:  Facial or Scalp Contusion, Easy-to-Read, Laceration Care, Adult, 

Laceration Care, Adult, Easy-to-Read


Referrals: 


Veronique Handley DO [Primary Care Provider] - 


Forms:  ED Department Discharge


Care Plan Goals: 


Your tetanus has been updated today.  The sutures will need to be removed in 5 

days which can be done either by your nursing staff at [facility] or in the 

clinic.  A light coating of bacitracin should be applied over the wound twice 

daily.  It will likely be tender for the next couple of days.  Please keep the 

wound clean and dry at least for the next 24 hours.





Sepsis Event Note (ED)





- Evaluation


Sepsis Screening Result: No Definite Risk





- Problem List & Annotations


(1) Fall


SNOMED Code(s): 1683070, 625453652


   Code(s): W19.XXXA - UNSPECIFIED FALL, INITIAL ENCOUNTER   Status: Acute   

Priority: Medium   Current Visit: Yes   


Qualifiers: 


   Encounter type: initial encounter   Qualified Code(s): W19.XXXA - Unspecified

fall, initial encounter   





(2) Forehead contusion


SNOMED Code(s): 172491131


   Code(s): S00.83XA - CONTUSION OF OTHER PART OF HEAD, INITIAL ENCOUNTER   

Status: Acute   Priority: Medium   Current Visit: Yes   


Qualifiers: 


   Encounter type: initial encounter   Qualified Code(s): S00.83XA - Contusion 

of other part of head, initial encounter   





(3) Forehead laceration


SNOMED Code(s): 972579009


   Code(s): S01.81XA - LACERATION W/O FOREIGN BODY OF OTH PART OF HEAD, INIT 

ENCNTR   Status: Acute   Priority: Medium   Current Visit: Yes   


Qualifiers: 


   Encounter type: initial encounter   Qualified Code(s): S01.81XA - Laceration 

without foreign body of other part of head, initial encounter   





- Problem List Review


Problem List Initiated/Reviewed/Updated: Yes





<Ross Arroyo - Last Filed: 05/28/21 08:21>





Course





- Vital Signs


Text/Narrative:: 





Case discussed with Dr. Celaya of neurosurgery at 0820h


Last Recorded V/S: 


                                Last Vital Signs











Temp  35.9 C L  05/28/21 05:45


 


Pulse  65   05/28/21 06:42


 


Resp  19   05/28/21 06:42


 


BP  140/78   05/28/21 06:42


 


Pulse Ox  97   05/28/21 06:42














- Orders/Labs/Meds


Orders: 


                               Active Orders 24 hr











 Category Date Time Status


 


 Vaccines to be Administered [RC] PER UNIT ROUTINE Care  05/28/21 06:25 Active











Labs: 


                                Laboratory Tests











  05/28/21 Range/Units





  07:33 


 


Urine Color  Yellow  (YELLOW)  


 


Urine Appearance  Clear  (CLEAR)  


 


Urine pH  6.0  (5.0-8.0)  


 


Ur Specific Gravity  1.015  (1.008-1.030)  


 


Urine Protein  Negative  (NEGATIVE)  mg/dL


 


Urine Glucose (UA)  Negative  (NEGATIVE)  mg/dL


 


Urine Ketones  Negative  (NEGATIVE)  mg/dL


 


Urine Occult Blood  Negative  (NEGATIVE)  


 


Urine Nitrite  Negative  (NEGATIVE)  


 


Urine Bilirubin  Negative  (NEGATIVE)  


 


Urine Urobilinogen  0.2  (0.2-1.0)  EU/dL


 


Ur Leukocyte Esterase  Negative  (NEGATIVE)  


 


Urine RBC  0-5  (0-5)  


 


Urine WBC  0-5  (0-5)  


 


Ur Epithelial Cells  Not seen  


 


Amorphous Sediment  Not seen  


 


Urine Bacteria  Not seen  


 


Urine Mucus  Not seen  











Meds: 


Medications














Discontinued Medications














Generic Name Dose Route Start Last Admin





  Trade Name Cbq  PRN Reason Stop Dose Admin


 


Bacitracin  1 dose  05/28/21 06:04  05/28/21 06:09





  Bacitracin Oint 1 Gm U/D Packet  TOP  05/28/21 06:05  1 dose





  ONETIME ONE   Administration


 


Diphtheria/Tetanus/Acell Pertussis  0.5 ml  05/28/21 06:25  05/28/21 07:22





  Diphtheria,Pertussis(Acell),Tetanus Vaccine 0.5 Ml Syringe  IM  05/28/21 06:26

  0.5 ml





  .ONCE ONE   Administration


 


Hydromorphone HCl  0.5 mg  05/28/21 07:38  05/28/21 07:42





  Hydromorphone 0.5 Mg/0.5 Ml Syringe  IM  05/28/21 07:39  0.5 mg





  ONETIME ONE   Administration


 


Lidocaine/Epinephrine  5 ml  05/28/21 06:04  05/28/21 06:09





  Lidocaine 1% With Epinephrine 1:100,000 50 Ml Mdv  INFILT  05/28/21 06:05  5 

ml





  ONETIME STA   Administration














- Radiology Interpretation


Free Text/Narrative:: 





Head CT scan-neg








Cervical spine CT-IMPRESSION:


1. Acute nondisplaced fracture in the anterior base of the odontoid. No other 

sign of acute injury.


2. Multilevel degenerative spondylosis.





Dictated by Cristofer Velasco MD @ 5/28/2021 7:37:15 AM





CT Results Date: 05/28/21





Departure





- Departure


Time of Disposition: 08:30





- Discharge Information


*PRESCRIPTION DRUG MONITORING PROGRAM REVIEWED*: No


*COPY OF PRESCRIPTION DRUG MONITORING REPORT IN PATIENT PORSCHE: No





Sepsis Event Note (ED)





- Focused Exam


Vital Signs: 


                                   Vital Signs











  Temp Pulse Resp BP Pulse Ox


 


 05/28/21 06:42   65  19  140/78  97


 


 05/28/21 05:45  35.9 C L  62  17  164/77 H  99


 


 05/28/21 05:44  35.9 C L  62  17  164/77 H  99

## 2021-05-28 NOTE — CRLCT
INDICATION:



Fall, head injury.



TECHNIQUE:



Head CT without contrast.



COMPARISON:



July 2, 2017. 



FINDINGS:



CSF spaces: Within normal limits for age.  



Brain parenchyma and extra-axial spaces: There are nonspecific low 

attenuation white matter changes consistent with chronic microvascular 

disease.  No sign of mass, hemorrhage, or midline shift.  



Skull base and calvarium: The visualized paranasal sinuses and mastoid air 

cells demonstrate no acute or significant findings.  The visualized orbits 

are grossly unremarkable.  No skull fractures. Left frontal scalp 

contusion. 



IMPRESSION:



Left frontal scalp contusion without fracture or intracranial hemorrhage.



Dictated by Cristofer Velasco MD @ 5/28/2021 7:33:18 AM



Dictated by: Cristofer Velasco MD @ 05/28/2021 07:33:25



(Electronically Signed)

## 2021-05-28 NOTE — CRLCT
INDICATION:



Trauma, fall with neck pain.



TECHNIQUE:



CT cervical spine without contrast.



COMPARISON:



March 27, 2011. 



FINDINGS:



Vertebrae: Alignment is normal.  Acute nondisplaced fracture is present in 

the anterior base of the odontoid, this is best visualized on the sagittal 

series 7, image 25. No other fractures. 



Discs and facet joints: There are moderate multilevel degenerative changes 

in the disc spaces and facet joints. 



Extraspinal findings: Paraspinous soft tissues are unremarkable.  



IMPRESSION:



1. Acute nondisplaced fracture in the anterior base of the odontoid. No 

other sign of acute injury.



2. Multilevel degenerative spondylosis.



Dictated by Cristofer Velasco MD @ 5/28/2021 7:37:15 AM



Dictated by: Cristofer Velasco MD @ 05/28/2021 07:37:23



(Electronically Signed)

## 2021-08-29 ENCOUNTER — HOSPITAL ENCOUNTER (EMERGENCY)
Dept: HOSPITAL 11 - JP.ED | Age: 86
Discharge: NURSING, REHAB OR LONG-TERM CARE | End: 2021-08-29
Payer: MEDICARE

## 2021-08-29 VITALS — HEART RATE: 82 BPM | DIASTOLIC BLOOD PRESSURE: 82 MMHG | SYSTOLIC BLOOD PRESSURE: 146 MMHG

## 2021-08-29 DIAGNOSIS — Z79.899: ICD-10-CM

## 2021-08-29 DIAGNOSIS — Z79.01: ICD-10-CM

## 2021-08-29 DIAGNOSIS — Z87.891: ICD-10-CM

## 2021-08-29 DIAGNOSIS — K59.09: Primary | ICD-10-CM

## 2021-08-29 DIAGNOSIS — I10: ICD-10-CM

## 2021-08-29 DIAGNOSIS — Z88.8: ICD-10-CM

## 2021-08-29 DIAGNOSIS — Z88.0: ICD-10-CM

## 2021-08-29 DIAGNOSIS — Z88.6: ICD-10-CM

## 2021-08-29 DIAGNOSIS — K21.9: ICD-10-CM

## 2021-08-29 NOTE — CRLCT
For Patients:  As a result of the 21st Century Cures Act, medical imaging 

exams and procedure reports are released immediately into your electronic 

medical record.  You may view this report before your referring provider.  

If you have questions, please contact your health care provider.



Indication:



Left lower quadrant pain 



Technique:



 Noncontrast CT abdomen and pelvis. 



Comparison:



CT pelvis 06/29/2021 



Findings:



 Heart size normal. Basilar atelectasis. No effusion. 



Cholelithiasis. Small hiatal hernia. Unenhanced liver spleen adrenal glands 

unremarkable pancreas unremarkable. Low-density lesions both kidneys 

incompletely assessed but could represent cysts. No hydronephrosis. 

Exophytic right superior renal lesion series 2, image 39 measuring 2 cm 

appears questionably slightly dense.



Abundant stool in the rectum no bowel obstruction. Diverticulosis. 

Lobulated urinary bladder with multiple diverticula. Prostate gland 

slightly prominent. Soft tissue nodularity in the region the right inguinal 

canal this is unchanged from the prior study question possible prior hernia 

repair. No inflammatory change seen. Mild aneurysmal dilatation of the 

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 Partially seen is 

induration skin thickening over the left gluteus this is significantly 

decreased from the prior study no abscess 



No suspicious bony lesions. 



Impression:



 1. No acute findings in abdomen or pelvis. Diverticulosis. 



2. Cholelithiasis. 



3. Question slightly dense 2 centimeter exophytic right superior renal 

lesion. Consider routine renal ultrasound.



Please note that all CT scans at this facility use dose modulation, 

iterative reconstruction, and/or weight-based dosing when appropriate to 

reduce radiation dose to as low as reasonably achievable.



Dictated by Violetta Pickett MD @ 8/29/2021 12:49:51 PM



Signed by Dr. Violetta Pickett @ Aug 29 2021 12:49PM

## 2021-08-29 NOTE — EDM.PDOC
ED HPI GENERAL MEDICAL PROBLEM





- General


Chief Complaint: Abdominal Pain


Stated Complaint: MEDICAL VIA NORTH


Time Seen by Provider: 08/29/21 11:25


Source of Information: Reports: Patient, EMS, Nursing Home Records, RN


History Limitations: Reports: Altered Mental Status (Patient lives in a memory 

unit.  He is quite clear today)





- History of Present Illness


Onset: Gradual


Onset Date: 08/24/21


Duration: Getting Worse


Location: Reports: Abdomen (Left lower quadrant), Pelvis


Quality: Reports: Sharp


Severity: Moderate


Improves with: Reports: None


Worsens with: Reports: Other (Palpation), Movement


Associated Symptoms: Denies: Fever/Chills, Nausea/Vomiting


Treatments PTA: Reports: Other Medication(s) (GI meds for constipation)


  ** Abdomen


Pain Score (Numeric/FACES): 8





- Related Data


                                    Allergies











Allergy/AdvReac Type Severity Reaction Status Date / Time


 


diazepam [From Valium] Allergy  Change Verified 08/29/21 11:13





   Mental  





   Status  


 


fentanyl Allergy  Rash Verified 08/29/21 11:13


 


nitroglycerin Allergy  Cannot Verified 08/29/21 11:13





   Remember  


 


Penicillins Allergy  Rash Verified 08/29/21 11:13











Home Meds: 


                                    Home Meds





Pravastatin Sodium 20 mg PO BEDTIME 09/07/13 [History]


Warfarin [Coumadin] 2 mg PO .ALLDAYSBUTMONDAY 09/07/13 [History]


Sennosides [Senna] 1 tab PO DAILY 10/24/13 [History]


Warfarin [Coumadin] 1.5 mg PO .MONDAY 11/10/14 [History]


Acetaminophen [Tylenol] 650 mg PO BID 07/14/15 [History]


Sertraline HCl 100 mg PO DAILY 10/02/19 [History]


Famotidine [Pepcid] 20 mg PO BID 05/28/21 [History]


Lactobacillus Rhamnosus GG [Culturelle] 1 cap PO TID 05/28/21 [History]


Sennosides/Docusate Sodium [Senna-S 8.6-50 mg Tablet] 2 tab PO DAILY PRN 

05/28/21 [History]


Tamsulosin [Flomax] 0.4 mg PO DAILY 05/28/21 [History]


Donepezil HCl 5 mg PO DAILY 06/27/21 [History]


Cetirizine HCl [Allergy Relief] 10 mg PO QID PRN 06/28/21 [History]


Diclofenac Sodium [Voltaren 1% Gel] 1 gm TOP TID 06/28/21 [History]


Donepezil HCl 10 mg PO BEDTIME 06/28/21 [History]


Hydrocodone/Acetaminophen [HYDROcodone-Acetaminophen 5-325 MG] 1 tab PO Q6H PRN 

06/28/21 [History]


Lactulose [Chronulac] 30 ml PO DAILY PRN 06/28/21 [History]


Lidocaine 4% [Aspercreme 4%] 1 applic TOP Q4H PRN 06/28/21 [History]


Magnesium Hydroxide [Milk of Magnesia] 30 ml PO DAILY PRN 06/28/21 [History]


Mineral Oil, Light/Mineral Oil [Soothe Xp Eye Drops] 1 drop EYEBOTH QID 06/28/21

[History]


Simethicone [Gas Relief] 80 mg PO Q2H PRN 06/28/21 [History]


Sodium Chloride 5% [Susi 128 5% Ophth Soln] 1 drop EYEBOTH BID 06/28/21 

[History]


Sodium Chloride [Deep Sea] 1 spray NASBOTH Q2HR PRN 06/28/21 [History]


polyethylene glycoL 3350 [Gavilax] 17 gm PO DAILY 06/28/21 [History]


Clindamycin HCl 450 mg PO TID #87 capsule 07/02/21 [Rx]


Gabapentin [Neurontin] 300 mg PO DAILY #30 cap 07/02/21 [Rx]


Menthol/Zinc Oxide [Calmoseptine] 1 applic TOP BID #3 tube 07/02/21 [Rx]


QUEtiapine [SEROquel] 50 mg PO BEDTIME #30 tab 07/02/21 [Rx]











Past Medical History


HEENT History: Reports: Allergic Rhinitis


Cardiovascular History: Reports: Afib, Hypertension, Pacemaker


Gastrointestinal History: Reports: Chronic Constipation, GERD


Genitourinary History: Reports: BPH, Chronic Renal Insuffiency


Musculoskeletal History: Reports: Back Pain, Chronic, Neck Pain, Chronic, Other 

(See Below)


Other Musculoskeletal History: lumbago


Neurological History: Reports: Alzheimers Disease, Neuropathy, Peripheral


Psychiatric History: Reports: Alzheimers Disease, Anxiety, Depression, Damaso

lucinations, Other (See Below)


Other Psychiatric History: mild cognitive impairment


Hematologic History: Reports: Anticoagulation Therapy





- Infectious Disease History


Infectious Disease History: Reports: Chicken Pox, Measles





- Past Surgical History


Cardiovascular Surgical History: Reports: Pacer


GI Surgical History: Reports: Appendectomy, Hernia Repair/Other


Musculoskeletal Surgical History: Reports: Arthroscopic Knee





Social & Family History





- Tobacco Use


Tobacco Use Status *Q: Former Tobacco User


Used Tobacco, but Quit: Yes


Month/Year Tobacco Last Used: unknown





- Caffeine Use


Caffeine Use: Reports: Coffee





ED ROS GENERAL





- Review of Systems


Review Of Systems: See Below


Constitutional: Denies: Fever, Chills


HEENT: Reports: No Symptoms


Respiratory: Denies: Shortness of Breath, Wheezing, Cough


Cardiovascular: Reports: No Symptoms


GI/Abdominal: Reports: Abdominal Pain (Left lower quadrant), Constipation, 

Flatus.  Denies: Bloody Stool, Distension


: Reports: Other (Patient does not recall if he is having pain when he 

urinates)


Musculoskeletal: Reports: No Symptoms


Skin: Reports: No Symptoms


Neurological: Reports: Confusion (Alzheimer's patient)





ED EXAM, GI/ABD





- Physical Exam


Exam: See Below


Text/Narrative:: 





Patient resting on gurney without obvious distress.  Able to communicate needs. 

 Patient is definitive on left lower quadrant pain as well as pain bilaterally 

in the pelvic region over ureters.  Patient tender to palpation in these areas.


Exam Limited By: Altered Mental Status


General Appearance: Alert, WD/WN, Mild Distress


Respiratory/Chest: No Respiratory Distress, Lungs Clear, Normal Breath Sounds


Cardiovascular: Normal Peripheral Pulses, Regular Rate, Rhythm, No Edema


GI/Abdominal Exam: Normal Bowel Sounds, Soft, No Organomegaly, No Distention, No

 Mass, Guarding, Tender


Back Exam: Normal Inspection.  No: CVA Tenderness (R), CVA Tenderness (L)


Extremities: Normal Inspection, Normal Range of Motion (Upper extremities)


Neurological: Alert, Confused (Chronic patient is Alzheimer's however he is 

quite alert today)


Psychiatric: Normal Affect, Normal Mood


Skin Exam: Warm, Dry, Intact, Normal Color, No Rash


Lymphatic: No Adenopathy





Course





- Vital Signs


Last Recorded V/S: 


                                Last Vital Signs











Temp  36.6 C   08/29/21 11:22


 


Pulse  82   08/29/21 12:15


 


Resp  17   08/29/21 12:15


 


BP  146/82 H  08/29/21 12:15


 


Pulse Ox  98   08/29/21 12:15














- Radiology Interpretation


Free Text/Narrative:: 





CT of abdomen shows abundant stool in the rectum without obstruction.  Patient 

does have diverticulosis as well as a lobulated urinary bladder with multiple 

diverticula.  Radiology impression no acute findings in the abdomen or pelvis 

except for the diverticulosis, cholelithiasis.  Radiologist does note a question

 yaniv slightly dense 2 cm exophytic right superior renal lesion with 

suggestion to consider routine renal ultrasound





- Re-Assessments/Exams


Free Text/Narrative Re-Assessment/Exam: 





08/29/21 13:21


Continues to rest comfortably.  CT findings show large amounts of stool.  Will 

provide patient with an enema.


08/29/21 14:15


Enema with positive result.  Patient notes feeling much better.  Still has a 

mild amount of pain this can be explained as patient had a very large amount of 

stool on x-ray and only a medium result return with the enema.  Nursing home 

notified of enema and results.  Instructed to provide medications for bowel 

evacuation per his regular medication record and orders





Departure





- Departure


Time of Disposition: 14:28


Disposition: DC/Tfer to [facility]


Condition: Fair


Clinical Impression: 


 Constipation by delayed colonic transit








- Discharge Information


*PRESCRIPTION DRUG MONITORING PROGRAM REVIEWED*: Not Applicable


*COPY OF PRESCRIPTION DRUG MONITORING REPORT IN PATIENT PORSCHE: Not Applicable


Instructions:  Constipation, Adult, Easy-to-Read, Abdominal Pain, Adult, 

Easy-to-Read


Referrals: 


PCP,None [Primary Care Provider] - 


Forms:  ED Department Discharge


Additional Instructions: 


Follow-up with primary care provider regarding questionable slightly dense 2 cm 

exophytic right superior renal lesion.  Radiologist suggested routine renal 

ultrasound.


Care Plan Goals: 


Follow-up patient current orders regarding bowel movements and timing.





Sepsis Event Note (ED)





- Evaluation


Sepsis Screening Result: No Definite Risk





- Focused Exam


Vital Signs: 


                                   Vital Signs











  Temp Pulse Resp BP Pulse Ox


 


 08/29/21 12:15   82  17  146/82 H  98


 


 08/29/21 11:22  36.6 C  83  16  145/100 H  98


 


 08/29/21 11:14  36.6 C  83  16  145/100 H  98














- Assessment/Plan


Assessment:: 





Abundant stool in rectum no obstruction, constipation


Plan: 





Performed enema.  Moderate amount of return.  Likely some stool remains.  

Educated nursing home staff to continue bowel program as ordered in current r

ecord.  Follow-up with primary care provider if symptoms persist.

## 2021-09-04 ENCOUNTER — HOSPITAL ENCOUNTER (EMERGENCY)
Dept: HOSPITAL 11 - JP.ED | Age: 86
Discharge: NURSING, REHAB OR LONG-TERM CARE | End: 2021-09-04
Payer: MEDICARE

## 2021-09-04 VITALS — SYSTOLIC BLOOD PRESSURE: 147 MMHG | DIASTOLIC BLOOD PRESSURE: 72 MMHG | HEART RATE: 63 BPM

## 2021-09-04 DIAGNOSIS — K21.9: ICD-10-CM

## 2021-09-04 DIAGNOSIS — Z88.0: ICD-10-CM

## 2021-09-04 DIAGNOSIS — N18.9: ICD-10-CM

## 2021-09-04 DIAGNOSIS — F02.80: ICD-10-CM

## 2021-09-04 DIAGNOSIS — Z79.01: ICD-10-CM

## 2021-09-04 DIAGNOSIS — M25.512: Primary | ICD-10-CM

## 2021-09-04 DIAGNOSIS — G30.9: ICD-10-CM

## 2021-09-04 DIAGNOSIS — Z79.899: ICD-10-CM
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
## 2021-09-04 DIAGNOSIS — Z88.5: ICD-10-CM

## 2021-09-04 DIAGNOSIS — I12.9: ICD-10-CM

## 2021-09-04 DIAGNOSIS — Z88.8: ICD-10-CM

## 2021-09-04 DIAGNOSIS — I48.91: ICD-10-CM

## 2021-09-04 PROCEDURE — 36415 COLL VENOUS BLD VENIPUNCTURE: CPT

## 2021-09-04 PROCEDURE — 73030 X-RAY EXAM OF SHOULDER: CPT

## 2021-09-04 PROCEDURE — 85025 COMPLETE CBC W/AUTO DIFF WBC: CPT

## 2021-09-04 PROCEDURE — 99284 EMERGENCY DEPT VISIT MOD MDM: CPT

## 2021-09-04 PROCEDURE — 81001 URINALYSIS AUTO W/SCOPE: CPT

## 2021-09-04 NOTE — EDM.PDOC
ED HPI GENERAL MEDICAL PROBLEM





- General


Chief Complaint: General


Stated Complaint: head pain


Time Seen by Provider: 09/04/21 13:47


Source of Information: Reports: Patient, EMS





- History of Present Illness


INITIAL COMMENTS - FREE TEXT/NARRATIVE: 





89 yo male presents to the ER via EMS following a fall this morning and fall 

yesterday.  He was taking a shower this morning when he became dizzy.  As he was

trying to get back to his wheelchair he fell forward bumping his forehead on the

wall.  no LOC.  no redness or bruising of the head. HE does c/o pain in his left

shoulder.  He states he has had diarrhea for multiple days and frequent 

urination.  





- Related Data


                                    Allergies











Allergy/AdvReac Type Severity Reaction Status Date / Time


 


diazepam [From Valium] Allergy  Change Verified 09/04/21 13:44





   Mental  





   Status  


 


fentanyl Allergy  Rash Verified 09/04/21 13:44


 


nitroglycerin Allergy  Cannot Verified 09/04/21 13:44





   Remember  


 


Penicillins Allergy  Rash Verified 09/04/21 13:44











Home Meds: 


                                    Home Meds





Pravastatin Sodium 20 mg PO BEDTIME 09/07/13 [History]


Warfarin [Coumadin] 2 mg PO .ALLDAYSBUTMONDAY 09/07/13 [History]


Sennosides [Senna] 1 tab PO DAILY 10/24/13 [History]


Warfarin [Coumadin] 1.5 mg PO .MONDAY 11/10/14 [History]


Acetaminophen [Tylenol] 650 mg PO BID 07/14/15 [History]


Sertraline HCl 100 mg PO DAILY 10/02/19 [History]


Famotidine [Pepcid] 20 mg PO BID 05/28/21 [History]


Lactobacillus Rhamnosus GG [Culturelle] 1 cap PO TID 05/28/21 [History]


Sennosides/Docusate Sodium [Senna-S 8.6-50 mg Tablet] 2 tab PO DAILY PRN 

05/28/21 [History]


Tamsulosin [Flomax] 0.4 mg PO DAILY 05/28/21 [History]


Donepezil HCl 5 mg PO DAILY 06/27/21 [History]


Cetirizine HCl [Allergy Relief] 10 mg PO QID PRN 06/28/21 [History]


Diclofenac Sodium [Voltaren 1% Gel] 1 gm TOP TID 06/28/21 [History]


Donepezil HCl 10 mg PO BEDTIME 06/28/21 [History]


Hydrocodone/Acetaminophen [HYDROcodone-Acetaminophen 5-325 MG] 1 tab PO Q6H PRN 

06/28/21 [History]


Lactulose [Chronulac] 30 ml PO DAILY PRN 06/28/21 [History]


Lidocaine 4% [Aspercreme 4%] 1 applic TOP Q4H PRN 06/28/21 [History]


Magnesium Hydroxide [Milk of Magnesia] 30 ml PO DAILY PRN 06/28/21 [History]


Mineral Oil, Light/Mineral Oil [Soothe Xp Eye Drops] 1 drop EYEBOTH QID 06/28/21

[History]


Simethicone [Gas Relief] 80 mg PO Q2H PRN 06/28/21 [History]


Sodium Chloride 5% [Susi 128 5% Ophth Soln] 1 drop EYEBOTH BID 06/28/21 

[History]


Sodium Chloride [Deep Sea] 1 spray NASBOTH Q2HR PRN 06/28/21 [History]


polyethylene glycoL 3350 [Gavilax] 17 gm PO DAILY 06/28/21 [History]


Clindamycin HCl 450 mg PO TID #87 capsule 07/02/21 [Rx]


Gabapentin [Neurontin] 300 mg PO DAILY #30 cap 07/02/21 [Rx]


Menthol/Zinc Oxide [Calmoseptine] 1 applic TOP BID #3 tube 07/02/21 [Rx]


QUEtiapine [SEROquel] 50 mg PO BEDTIME #30 tab 07/02/21 [Rx]











Past Medical History


HEENT History: Reports: Allergic Rhinitis


Cardiovascular History: Reports: Afib, Hypertension, Pacemaker


Gastrointestinal History: Reports: Chronic Constipation, GERD


Genitourinary History: Reports: BPH, Chronic Renal Insuffiency


Musculoskeletal History: Reports: Back Pain, Chronic, Neck Pain, Chronic, Other 

(See Below)


Other Musculoskeletal History: lumbago


Neurological History: Reports: Alzheimers Disease, Neuropathy, Peripheral


Psychiatric History: Reports: Alzheimers Disease, Anxiety, Depression, 

Hallucinations, Other (See Below)


Other Psychiatric History: mild cognitive impairment


Hematologic History: Reports: Anticoagulation Therapy





- Infectious Disease History


Infectious Disease History: Reports: Chicken Pox, Measles





- Past Surgical History


Cardiovascular Surgical History: Reports: Pacer


GI Surgical History: Reports: Appendectomy, Hernia Repair/Other


Musculoskeletal Surgical History: Reports: Arthroscopic Knee





Social & Family History





- Tobacco Use


Tobacco Use Status *Q: Never Tobacco User





- Caffeine Use


Caffeine Use: Reports: Coffee





ED ROS GENERAL





- Review of Systems


Review Of Systems: See Below


Constitutional: Denies: Fever


Respiratory: Denies: Shortness of Breath, Wheezing


Cardiovascular: Denies: Chest Pain


Musculoskeletal: Reports: Joint Pain





ED EXAM, GENERAL





- Physical Exam


Exam: See Below


Exam Limited By: No Limitations


General Appearance: Alert, WD/WN, No Apparent Distress, Other (at baseline)


Head: Atraumatic, Normocephalic


Neck: Normal Inspection, Supple, Non-Tender.  No: Lymphadenopathy (R), 

Lymphadenopathy (L)


Respiratory/Chest: No Respiratory Distress, Lungs Clear.  No: Crackles, Wheezing


Cardiovascular: Regular Rate, Rhythm, No Murmur


Extremities: Other (left shoulder pain on palpation )


Neurological: Alert


Psychiatric: Normal Affect, Normal Mood


Skin Exam: Warm, Dry, Intact





Course





- Vital Signs


Last Recorded V/S: 


                                Last Vital Signs











Temp  36.2 C   09/04/21 13:47


 


Pulse  63   09/04/21 13:47


 


Resp  18   09/04/21 13:47


 


BP  147/72 H  09/04/21 13:47


 


Pulse Ox  99   09/04/21 13:47














- Orders/Labs/Meds


Orders: 


                               Active Orders 24 hr











 Category Date Time Status


 


 Shoulder Comp Lt [CR] Stat Exams  09/04/21 13:59 Taken











Labs: 


                                Laboratory Tests











  09/04/21 09/04/21 Range/Units





  14:10 15:17 


 


WBC  4.7   (4.5-11.0)  K/uL


 


RBC  3.40 L   (4.30-5.90)  M/uL


 


Hgb  10.4 L D   (12.0-15.0)  g/dL


 


Hct  33.5 L   (40.0-54.0)  %


 


MCV  99 H   (80-98)  fL


 


MCH  31   (27-31)  pg


 


MCHC  31 L   (32-36)  %


 


Plt Count  182   (150-400)  K/uL


 


Neut % (Auto)  60.4   (36-66)  %


 


Lymph % (Auto)  27.1   (24-44)  %


 


Mono % (Auto)  7.0 H   (2-6)  %


 


Eos % (Auto)  4.9 H   (2-4)  %


 


Baso % (Auto)  0.6   (0-1)  %


 


Urine Color   Yellow  (YELLOW)  


 


Urine Appearance   Clear  (CLEAR)  


 


Urine pH   6.5  (5.0-8.0)  


 


Ur Specific Gravity   1.025  (1.008-1.030)  


 


Urine Protein   30 H  (NEGATIVE)  mg/dL


 


Urine Glucose (UA)   Negative  (NEGATIVE)  mg/dL


 


Urine Ketones   Negative  (NEGATIVE)  mg/dL


 


Urine Occult Blood   Negative  (NEGATIVE)  


 


Urine Nitrite   Negative  (NEGATIVE)  


 


Urine Bilirubin   Negative  (NEGATIVE)  


 


Urine Urobilinogen   0.2  (0.2-1.0)  EU/dL


 


Ur Leukocyte Esterase   Negative  (NEGATIVE)  


 


Urine RBC   0-5  (0-5)  


 


Urine WBC   0-5  (0-5)  


 


Ur Epithelial Cells   Not seen  


 


Amorphous Sediment   Not seen  


 


Urine Bacteria   Not seen  


 


Urine Mucus   Rare  











Meds: 


Medications














Discontinued Medications














Generic Name Dose Route Start Last Admin





  Trade Name Freq  PRN Reason Stop Dose Admin


 


Acetaminophen  650 mg  09/04/21 13:58  09/04/21 14:10





  Acetaminophen 325 Mg Tab  PO  09/04/21 13:59  650 mg





  NOW ONE   Administration














Departure





- Departure


Time of Disposition: 15:42


Disposition: DC[facility]


Condition: Good


Clinical Impression: 


Fall


Qualifiers:


 Encounter type: initial encounter Qualified Code(s): W19.XXXA - Unspecified 

fall, initial encounter





Shoulder pain, left


Qualifiers:


 Chronicity: acute Qualified Code(s): M25.512 - Pain in left shoulder








- Discharge Information


*PRESCRIPTION DRUG MONITORING PROGRAM REVIEWED*: Not Applicable


*COPY OF PRESCRIPTION DRUG MONITORING REPORT IN PATIENT PORSCHE: Not Applicable


Instructions:  Joint Pain, Easy-to-Read


Referrals: 


PCP,None [Primary Care Provider] - 


Forms:  ED Department Discharge


Additional Instructions: 


negative for UTI and WBC is normal


xray of shoulder without fracture


full movement of shoulder


return to care facility





Sepsis Event Note (ED)





- Evaluation


Sepsis Screening Result: No Definite Risk





- Focused Exam


Vital Signs: 


                                   Vital Signs











  Temp Pulse Resp BP Pulse Ox


 


 09/04/21 13:47  36.2 C  63  18  147/72 H  99


 


 09/04/21 13:40  36.2 C  63  18  147/72 H  99














- My Orders


Last 24 Hours: 


My Active Orders





09/04/21 13:59


Shoulder Comp Lt [CR] Stat 














- Assessment/Plan


Last 24 Hours: 


My Active Orders





09/04/21 13:59


Shoulder Comp Lt [CR] Stat

## 2021-09-06 NOTE — CR
Shoulder Comp Lt

 

CLINICAL HISTORY: Trauma

 

FINDINGS: There is no acute fracture or dislocation in the left shoulder. There

is spurring at the AC joint. Glenohumeral joint appears intact.

 

Impression: Negative

## 2022-03-19 ENCOUNTER — HOSPITAL ENCOUNTER (EMERGENCY)
Dept: HOSPITAL 11 - JP.ED | Age: 87
LOS: 1 days | Discharge: NURSING, REHAB OR LONG-TERM CARE | End: 2022-03-20
Payer: MEDICARE

## 2022-03-19 DIAGNOSIS — I12.9: ICD-10-CM

## 2022-03-19 DIAGNOSIS — K21.9: ICD-10-CM

## 2022-03-19 DIAGNOSIS — Z88.0: ICD-10-CM

## 2022-03-19 DIAGNOSIS — N18.9: ICD-10-CM

## 2022-03-19 DIAGNOSIS — Z88.1: ICD-10-CM

## 2022-03-19 DIAGNOSIS — Z79.899: ICD-10-CM

## 2022-03-19 DIAGNOSIS — Z79.01: ICD-10-CM

## 2022-03-19 DIAGNOSIS — N40.0: ICD-10-CM

## 2022-03-19 DIAGNOSIS — Z88.5: ICD-10-CM

## 2022-03-19 DIAGNOSIS — Z95.0: ICD-10-CM

## 2022-03-19 DIAGNOSIS — I48.91: ICD-10-CM

## 2022-03-19 DIAGNOSIS — E87.5: Primary | ICD-10-CM

## 2022-03-19 PROCEDURE — 84132 ASSAY OF SERUM POTASSIUM: CPT

## 2022-03-19 PROCEDURE — 80053 COMPREHEN METABOLIC PANEL: CPT

## 2022-03-19 PROCEDURE — 81001 URINALYSIS AUTO W/SCOPE: CPT

## 2022-03-19 PROCEDURE — 85025 COMPLETE CBC W/AUTO DIFF WBC: CPT

## 2022-03-19 PROCEDURE — 99285 EMERGENCY DEPT VISIT HI MDM: CPT

## 2022-03-19 PROCEDURE — 99284 EMERGENCY DEPT VISIT MOD MDM: CPT

## 2022-03-19 PROCEDURE — 85610 PROTHROMBIN TIME: CPT

## 2022-03-19 PROCEDURE — 36415 COLL VENOUS BLD VENIPUNCTURE: CPT

## 2022-03-20 VITALS — SYSTOLIC BLOOD PRESSURE: 124 MMHG | HEART RATE: 76 BPM | DIASTOLIC BLOOD PRESSURE: 78 MMHG
